# Patient Record
Sex: MALE | Race: WHITE | NOT HISPANIC OR LATINO | Employment: FULL TIME | URBAN - METROPOLITAN AREA
[De-identification: names, ages, dates, MRNs, and addresses within clinical notes are randomized per-mention and may not be internally consistent; named-entity substitution may affect disease eponyms.]

---

## 2017-01-30 ENCOUNTER — SKIN CANCER EXAM (OUTPATIENT)
Dept: URBAN - METROPOLITAN AREA CLINIC 32 | Facility: CLINIC | Age: 62
Setting detail: DERMATOLOGY
End: 2017-01-30

## 2017-01-30 PROBLEM — L57.0 ACTINIC KERATOSIS: Status: ACTIVE | Noted: 2017-01-30

## 2017-01-30 PROBLEM — L57.0 ACTINIC KERATOSIS: Status: RESOLVED | Noted: 2017-01-30

## 2017-01-30 PROCEDURE — 99203 OFFICE O/P NEW LOW 30 MIN: CPT

## 2017-01-30 PROCEDURE — 17000 DESTRUCT PREMALG LESION: CPT

## 2017-01-30 PROCEDURE — 17003 DESTRUCT PREMALG LES 2-14: CPT

## 2017-02-15 ENCOUNTER — FOLLOW UP (OUTPATIENT)
Dept: URBAN - METROPOLITAN AREA CLINIC 32 | Facility: CLINIC | Age: 62
Setting detail: DERMATOLOGY
End: 2017-02-15

## 2017-02-15 PROBLEM — L82.0 INFLAMED SEBORRHEIC KERATOSIS: Status: ACTIVE | Noted: 2017-02-15

## 2017-02-15 PROBLEM — L82.0 INFLAMED SEBORRHEIC KERATOSIS: Status: RESOLVED | Noted: 2017-02-15

## 2017-02-15 PROCEDURE — OTHER NO CHARGE OFFICE VISIT: OTHER

## 2018-02-07 ENCOUNTER — SKIN CANCER EXAM (OUTPATIENT)
Dept: URBAN - METROPOLITAN AREA CLINIC 32 | Facility: CLINIC | Age: 63
Setting detail: DERMATOLOGY
End: 2018-02-07

## 2018-02-07 PROBLEM — L82.0 INFLAMED SEBORRHEIC KERATOSIS: Status: RESOLVED | Noted: 2018-02-07

## 2018-02-07 PROBLEM — L57.0 ACTINIC KERATOSIS: Status: ACTIVE | Noted: 2018-02-07

## 2018-02-07 PROBLEM — L57.0 ACTINIC KERATOSIS: Status: RESOLVED | Noted: 2018-02-07

## 2018-02-07 PROBLEM — L82.0 INFLAMED SEBORRHEIC KERATOSIS: Status: ACTIVE | Noted: 2018-02-07

## 2018-02-07 PROCEDURE — 17003 DESTRUCT PREMALG LES 2-14: CPT

## 2018-02-07 PROCEDURE — 17110 DESTRUCT B9 LESION 1-14: CPT

## 2018-02-07 PROCEDURE — 17000 DESTRUCT PREMALG LESION: CPT

## 2018-02-07 PROCEDURE — 99214 OFFICE O/P EST MOD 30 MIN: CPT

## 2018-04-11 ENCOUNTER — SEE NOTE (OUTPATIENT)
Dept: URBAN - METROPOLITAN AREA CLINIC 32 | Facility: CLINIC | Age: 63
Setting detail: DERMATOLOGY
End: 2018-04-11

## 2018-04-11 PROBLEM — L57.0 ACTINIC KERATOSIS: Status: RESOLVED | Noted: 2018-04-11

## 2018-04-11 PROCEDURE — 17000 DESTRUCT PREMALG LESION: CPT

## 2018-06-20 ENCOUNTER — FOLLOW UP (OUTPATIENT)
Dept: URBAN - METROPOLITAN AREA CLINIC 32 | Facility: CLINIC | Age: 63
Setting detail: DERMATOLOGY
End: 2018-06-20

## 2018-06-20 PROBLEM — L82.1 OTHER SEBORRHEIC KERATOSIS: Status: RESOLVED | Noted: 2018-06-20

## 2018-06-20 PROBLEM — L82.1 OTHER SEBORRHEIC KERATOSIS: Status: ACTIVE | Noted: 2018-06-20

## 2018-06-20 PROBLEM — L57.0 ACTINIC KERATOSIS: Status: RESOLVED | Noted: 2018-06-20

## 2018-06-20 PROCEDURE — 99213 OFFICE O/P EST LOW 20 MIN: CPT

## 2018-06-20 PROCEDURE — 17003 DESTRUCT PREMALG LES 2-14: CPT

## 2018-06-20 PROCEDURE — 17000 DESTRUCT PREMALG LESION: CPT

## 2019-02-13 ENCOUNTER — SKIN CANCER EXAM (OUTPATIENT)
Dept: URBAN - METROPOLITAN AREA CLINIC 32 | Facility: CLINIC | Age: 64
Setting detail: DERMATOLOGY
End: 2019-02-13

## 2019-02-13 DIAGNOSIS — L21.9 SEBORRHEIC DERMATITIS, UNSPECIFIED: ICD-10-CM

## 2019-02-13 DIAGNOSIS — L82.1 OTHER SEBORRHEIC KERATOSIS: ICD-10-CM

## 2019-02-13 PROCEDURE — 99214 OFFICE O/P EST MOD 30 MIN: CPT

## 2020-02-19 ENCOUNTER — SKIN CANCER EXAM (OUTPATIENT)
Dept: URBAN - METROPOLITAN AREA CLINIC 32 | Facility: CLINIC | Age: 65
Setting detail: DERMATOLOGY
End: 2020-02-19

## 2020-02-19 DIAGNOSIS — L90.5 SCAR CONDITIONS AND FIBROSIS OF SKIN: ICD-10-CM

## 2020-02-19 PROBLEM — D18.01 HEMANGIOMA OF SKIN AND SUBCUTANEOUS TISSUE: Status: ACTIVE | Noted: 2020-02-19

## 2020-02-19 PROBLEM — D18.01 HEMANGIOMA OF SKIN AND SUBCUTANEOUS TISSUE: Status: RESOLVED | Noted: 2020-02-19

## 2020-02-19 PROBLEM — L57.0 ACTINIC KERATOSIS: Status: RESOLVED | Noted: 2020-02-19

## 2020-02-19 PROBLEM — L82.1 OTHER SEBORRHEIC KERATOSIS: Status: RESOLVED | Noted: 2020-02-19

## 2020-02-19 PROBLEM — L82.1 OTHER SEBORRHEIC KERATOSIS: Status: ACTIVE | Noted: 2020-02-19

## 2020-02-19 PROCEDURE — 99214 OFFICE O/P EST MOD 30 MIN: CPT

## 2020-02-19 PROCEDURE — 17000 DESTRUCT PREMALG LESION: CPT

## 2020-02-19 PROCEDURE — 17003 DESTRUCT PREMALG LES 2-14: CPT

## 2021-02-24 ENCOUNTER — SKIN CANCER EXAM (OUTPATIENT)
Dept: URBAN - METROPOLITAN AREA CLINIC 32 | Facility: CLINIC | Age: 66
Setting detail: DERMATOLOGY
End: 2021-02-24

## 2021-02-24 DIAGNOSIS — Z41.9 ENCOUNTER FOR PROCEDURE FOR PURPOSES OTHER THAN REMEDYING HEALTH STATE, UNSPECIFIED: ICD-10-CM

## 2021-02-24 PROBLEM — D18.01 HEMANGIOMA OF SKIN AND SUBCUTANEOUS TISSUE: Status: ACTIVE | Noted: 2021-02-24

## 2021-02-24 PROBLEM — L57.0 ACTINIC KERATOSIS: Status: RESOLVED | Noted: 2021-02-24

## 2021-02-24 PROBLEM — D18.01 HEMANGIOMA OF SKIN AND SUBCUTANEOUS TISSUE: Status: RESOLVED | Noted: 2021-02-24

## 2021-02-24 PROBLEM — L82.1 OTHER SEBORRHEIC KERATOSIS: Status: RESOLVED | Noted: 2021-02-24

## 2021-02-24 PROBLEM — L82.1 OTHER SEBORRHEIC KERATOSIS: Status: ACTIVE | Noted: 2021-02-24

## 2021-02-24 PROCEDURE — 17003 DESTRUCT PREMALG LES 2-14: CPT

## 2021-02-24 PROCEDURE — 99213 OFFICE O/P EST LOW 20 MIN: CPT

## 2021-02-24 PROCEDURE — 17000 DESTRUCT PREMALG LESION: CPT

## 2022-03-09 ENCOUNTER — SKIN CANCER EXAM (OUTPATIENT)
Dept: URBAN - METROPOLITAN AREA CLINIC 32 | Facility: CLINIC | Age: 67
Setting detail: DERMATOLOGY
End: 2022-03-09

## 2022-03-09 DIAGNOSIS — L21.8 OTHER SEBORRHEIC DERMATITIS: ICD-10-CM

## 2022-03-09 DIAGNOSIS — L82.1 OTHER SEBORRHEIC KERATOSIS: ICD-10-CM

## 2022-03-09 PROBLEM — L82.0 INFLAMED SEBORRHEIC KERATOSIS: Status: ACTIVE | Noted: 2022-03-09

## 2022-03-09 PROBLEM — L82.0 INFLAMED SEBORRHEIC KERATOSIS: Status: RESOLVED | Noted: 2022-03-09

## 2022-03-09 PROBLEM — L57.0 ACTINIC KERATOSIS: Status: RESOLVED | Noted: 2022-03-09

## 2022-03-09 PROCEDURE — 17000 DESTRUCT PREMALG LESION: CPT

## 2022-03-09 PROCEDURE — 99213 OFFICE O/P EST LOW 20 MIN: CPT

## 2022-03-09 PROCEDURE — 17110 DESTRUCT B9 LESION 1-14: CPT

## 2022-03-09 PROCEDURE — 17003 DESTRUCT PREMALG LES 2-14: CPT

## 2022-05-18 ENCOUNTER — FOLLOW UP (OUTPATIENT)
Dept: URBAN - METROPOLITAN AREA CLINIC 32 | Facility: CLINIC | Age: 67
Setting detail: DERMATOLOGY
End: 2022-05-18

## 2022-05-18 DIAGNOSIS — B08.1 MOLLUSCUM CONTAGIOSUM: ICD-10-CM

## 2022-05-18 PROBLEM — L82.0 INFLAMED SEBORRHEIC KERATOSIS: Status: RESOLVED | Noted: 2022-05-18

## 2022-05-18 PROBLEM — L82.0 INFLAMED SEBORRHEIC KERATOSIS: Status: ACTIVE | Noted: 2022-05-18

## 2022-05-18 PROBLEM — L57.0 ACTINIC KERATOSIS: Status: RESOLVED | Noted: 2022-05-18

## 2022-05-18 PROCEDURE — 17000 DESTRUCT PREMALG LESION: CPT

## 2022-05-18 PROCEDURE — 17110 DESTRUCT B9 LESION 1-14: CPT

## 2022-05-18 PROCEDURE — 17003 DESTRUCT PREMALG LES 2-14: CPT

## 2023-05-10 ENCOUNTER — OFFICE VISIT (OUTPATIENT)
Dept: URBAN - METROPOLITAN AREA CLINIC 111 | Facility: CLINIC | Age: 68
End: 2023-05-10

## 2023-05-10 VITALS — BODY MASS INDEX: 22.04 KG/M2 | HEIGHT: 68 IN

## 2023-05-10 RX ORDER — OLMESARTAN MEDOXOMIL / AMLODIPINE BESYLATE / HYDROCHLOROTHIAZIDE 5; 12.5; 2 MG/1; MG/1; MG/1
TABLET, FILM COATED ORAL
Qty: 0 | Refills: 0 | Status: ACTIVE | COMMUNITY
Start: 1900-01-01

## 2023-05-10 RX ORDER — ASPIRIN 325 MG/1
TABLET ORAL
Qty: 0 | Refills: 0 | Status: ACTIVE | COMMUNITY
Start: 1900-01-01

## 2023-05-10 RX ORDER — BUPROPION HYDROCHLORIDE 150 MG/1
TABLET, EXTENDED RELEASE ORAL
Qty: 0 | Refills: 0 | Status: ACTIVE | COMMUNITY
Start: 1900-01-01

## 2023-05-10 RX ORDER — METHOTREXATE 2.5 MG/1
TABLET ORAL
Qty: 0 | Refills: 0 | Status: ACTIVE | COMMUNITY
Start: 1900-01-01

## 2023-05-10 RX ORDER — OMEPRAZOLE 20 MG/1
TABLET, DELAYED RELEASE ORAL
Qty: 0 | Refills: 0 | Status: ACTIVE | COMMUNITY
Start: 1900-01-01

## 2023-06-29 ENCOUNTER — LAB OUTSIDE AN ENCOUNTER (OUTPATIENT)
Dept: URBAN - METROPOLITAN AREA TELEHEALTH 2 | Facility: TELEHEALTH | Age: 68
End: 2023-06-29

## 2023-06-29 ENCOUNTER — TELEPHONE ENCOUNTER (OUTPATIENT)
Dept: URBAN - METROPOLITAN AREA CLINIC 126 | Facility: CLINIC | Age: 68
End: 2023-06-29

## 2023-06-29 ENCOUNTER — OFFICE VISIT (OUTPATIENT)
Dept: URBAN - METROPOLITAN AREA TELEHEALTH 2 | Facility: TELEHEALTH | Age: 68
End: 2023-06-29
Payer: MEDICARE

## 2023-06-29 DIAGNOSIS — R63.4 WEIGHT LOSS, UNINTENTIONAL: ICD-10-CM

## 2023-06-29 DIAGNOSIS — R68.81 EARLY SATIETY: ICD-10-CM

## 2023-06-29 DIAGNOSIS — R11.14 BILIOUS EMESIS: ICD-10-CM

## 2023-06-29 PROCEDURE — 99443 PHONE E/M BY PHYS 21-30 MIN: CPT | Performed by: INTERNAL MEDICINE

## 2023-06-29 RX ORDER — METHOTREXATE 2.5 MG/1
TABLET ORAL
Qty: 0 | Refills: 0 | Status: ACTIVE | COMMUNITY
Start: 1900-01-01

## 2023-06-29 RX ORDER — POTASSIUM CHLORIDE 1.5 G/1
POWDER, FOR SOLUTION ORAL
Qty: 0 | Refills: 0 | Status: ACTIVE | COMMUNITY
Start: 1900-01-01 | End: 1900-01-01

## 2023-06-29 RX ORDER — ASPIRIN 325 MG/1
TABLET ORAL
Qty: 0 | Refills: 0 | Status: ACTIVE | COMMUNITY
Start: 1900-01-01

## 2023-06-29 RX ORDER — BUPROPION HYDROCHLORIDE 150 MG/1
TABLET, EXTENDED RELEASE ORAL
Qty: 0 | Refills: 0 | Status: ACTIVE | COMMUNITY
Start: 1900-01-01

## 2023-06-29 RX ORDER — OMEPRAZOLE 20 MG/1
TABLET, DELAYED RELEASE ORAL
Qty: 0 | Refills: 0 | Status: ACTIVE | COMMUNITY
Start: 1900-01-01

## 2023-06-29 RX ORDER — OLMESARTAN MEDOXOMIL / AMLODIPINE BESYLATE / HYDROCHLOROTHIAZIDE 5; 12.5; 2 MG/1; MG/1; MG/1
TABLET, FILM COATED ORAL
Qty: 0 | Refills: 0 | Status: ACTIVE | COMMUNITY
Start: 1900-01-01

## 2023-06-29 NOTE — HPI-TODAY'S VISIT:
has nausea and vomiing  no real pain  uses nausea patch lost 15 pounds   no bleeding  has had gastritis but this is different

## 2023-07-05 ENCOUNTER — WEB ENCOUNTER (OUTPATIENT)
Dept: URBAN - METROPOLITAN AREA SURGERY CENTER 19 | Facility: SURGERY CENTER | Age: 68
End: 2023-07-05

## 2023-07-07 ENCOUNTER — WEB ENCOUNTER (OUTPATIENT)
Dept: URBAN - METROPOLITAN AREA SURGERY CENTER 19 | Facility: SURGERY CENTER | Age: 68
End: 2023-07-07

## 2023-07-11 ENCOUNTER — CLAIMS CREATED FROM THE CLAIM WINDOW (OUTPATIENT)
Dept: URBAN - METROPOLITAN AREA CLINIC 4 | Facility: CLINIC | Age: 68
End: 2023-07-11
Payer: MEDICARE

## 2023-07-11 ENCOUNTER — OFFICE VISIT (OUTPATIENT)
Dept: URBAN - METROPOLITAN AREA SURGERY CENTER 19 | Facility: SURGERY CENTER | Age: 68
End: 2023-07-11
Payer: MEDICARE

## 2023-07-11 ENCOUNTER — TELEPHONE ENCOUNTER (OUTPATIENT)
Dept: URBAN - METROPOLITAN AREA CLINIC 126 | Facility: CLINIC | Age: 68
End: 2023-07-11

## 2023-07-11 ENCOUNTER — OFFICE VISIT (OUTPATIENT)
Dept: URBAN - METROPOLITAN AREA CLINIC 82 | Facility: CLINIC | Age: 68
End: 2023-07-11

## 2023-07-11 DIAGNOSIS — K31.89 OTHER DISEASES OF STOMACH AND DUODENUM: ICD-10-CM

## 2023-07-11 DIAGNOSIS — K21.00 GASTROESOPHAGEAL REFLUX DISEASE WITH ESOPHAGITIS WITHOUT HEMORRHAGE: ICD-10-CM

## 2023-07-11 DIAGNOSIS — R11.2 NAUSEA & VOMITING: ICD-10-CM

## 2023-07-11 DIAGNOSIS — T47.8X5A ADVERSE EFFECT OF OTHER AGENTS PRIMARILY AFFECTING GASTROINTESTINAL SYSTEM, INITIAL ENCOUNTER: ICD-10-CM

## 2023-07-11 PROBLEM — 266433003: Status: ACTIVE | Noted: 2023-07-11

## 2023-07-11 PROCEDURE — 43239 EGD BIOPSY SINGLE/MULTIPLE: CPT | Performed by: INTERNAL MEDICINE

## 2023-07-11 PROCEDURE — 88305 TISSUE EXAM BY PATHOLOGIST: CPT | Performed by: PATHOLOGY

## 2023-07-11 PROCEDURE — G8907 PT DOC NO EVENTS ON DISCHARG: HCPCS | Performed by: INTERNAL MEDICINE

## 2023-07-11 PROCEDURE — 88312 SPECIAL STAINS GROUP 1: CPT | Performed by: PATHOLOGY

## 2023-07-11 RX ORDER — BUPROPION HYDROCHLORIDE 150 MG/1
TABLET, EXTENDED RELEASE ORAL
Qty: 0 | Refills: 0 | Status: ACTIVE | COMMUNITY
Start: 1900-01-01

## 2023-07-11 RX ORDER — PANTOPRAZOLE SODIUM 40 MG/1
1 TABLET TABLET, DELAYED RELEASE ORAL TWICE A DAY
Qty: 60 TABLET | Refills: 11 | OUTPATIENT
Start: 2023-07-11

## 2023-07-11 RX ORDER — METHOTREXATE 2.5 MG/1
TABLET ORAL
Qty: 0 | Refills: 0 | Status: ACTIVE | COMMUNITY
Start: 1900-01-01

## 2023-07-11 RX ORDER — OLMESARTAN MEDOXOMIL / AMLODIPINE BESYLATE / HYDROCHLOROTHIAZIDE 5; 12.5; 2 MG/1; MG/1; MG/1
TABLET, FILM COATED ORAL
Qty: 0 | Refills: 0 | Status: ACTIVE | COMMUNITY
Start: 1900-01-01

## 2023-07-11 RX ORDER — ASPIRIN 325 MG/1
TABLET ORAL
Qty: 0 | Refills: 0 | Status: ACTIVE | COMMUNITY
Start: 1900-01-01

## 2023-07-11 RX ORDER — POTASSIUM CHLORIDE 1.5 G/1
POWDER, FOR SOLUTION ORAL
Qty: 0 | Refills: 0 | Status: ACTIVE | COMMUNITY
Start: 1900-01-01 | End: 1900-01-01

## 2023-07-11 RX ORDER — OMEPRAZOLE 20 MG/1
TABLET, DELAYED RELEASE ORAL
Qty: 0 | Refills: 0 | Status: ACTIVE | COMMUNITY
Start: 1900-01-01

## 2023-07-11 RX ORDER — PANTOPRAZOLE SODIUM 40 MG/1
1 TABLET TABLET, DELAYED RELEASE ORAL TWICE A DAY
Qty: 60 TABLET | Refills: 11
Start: 2023-07-11

## 2023-07-12 ENCOUNTER — WEB ENCOUNTER (OUTPATIENT)
Dept: URBAN - METROPOLITAN AREA CLINIC 80 | Facility: CLINIC | Age: 68
End: 2023-07-12

## 2023-07-13 ENCOUNTER — WEB ENCOUNTER (OUTPATIENT)
Dept: URBAN - METROPOLITAN AREA CLINIC 80 | Facility: CLINIC | Age: 68
End: 2023-07-13

## 2023-07-13 RX ORDER — ONDANSETRON 8 MG/1
1 TABLET ON THE TONGUE AND ALLOW TO DISSOLVE AS NEEDED TABLET, ORALLY DISINTEGRATING ORAL ONCE A DAY
Qty: 30 | Refills: 5 | OUTPATIENT
Start: 2023-07-14

## 2023-07-13 RX ORDER — ASPIRIN 325 MG/1
TABLET ORAL
Qty: 0 | Refills: 0 | Status: ACTIVE | COMMUNITY
Start: 1900-01-01

## 2023-07-13 RX ORDER — OMEPRAZOLE 20 MG/1
TABLET, DELAYED RELEASE ORAL
Qty: 0 | Refills: 0 | Status: ACTIVE | COMMUNITY
Start: 1900-01-01

## 2023-07-13 RX ORDER — POTASSIUM CHLORIDE 1.5 G/1
POWDER, FOR SOLUTION ORAL
Qty: 0 | Refills: 0 | Status: ACTIVE | COMMUNITY
Start: 1900-01-01 | End: 1900-01-01

## 2023-07-13 RX ORDER — BUPROPION HYDROCHLORIDE 150 MG/1
TABLET, EXTENDED RELEASE ORAL
Qty: 0 | Refills: 0 | Status: ACTIVE | COMMUNITY
Start: 1900-01-01

## 2023-07-13 RX ORDER — METHOTREXATE 2.5 MG/1
TABLET ORAL
Qty: 0 | Refills: 0 | Status: ACTIVE | COMMUNITY
Start: 1900-01-01

## 2023-07-13 RX ORDER — PANTOPRAZOLE SODIUM 40 MG/1
1 TABLET TABLET, DELAYED RELEASE ORAL TWICE A DAY
Qty: 60 TABLET | Refills: 11 | Status: ACTIVE | COMMUNITY
Start: 2023-07-11

## 2023-07-13 RX ORDER — OLMESARTAN MEDOXOMIL / AMLODIPINE BESYLATE / HYDROCHLOROTHIAZIDE 5; 12.5; 2 MG/1; MG/1; MG/1
TABLET, FILM COATED ORAL
Qty: 0 | Refills: 0 | Status: ACTIVE | COMMUNITY
Start: 1900-01-01

## 2023-07-14 ENCOUNTER — WEB ENCOUNTER (OUTPATIENT)
Dept: URBAN - METROPOLITAN AREA CLINIC 80 | Facility: CLINIC | Age: 68
End: 2023-07-14

## 2023-07-18 ENCOUNTER — WEB ENCOUNTER (OUTPATIENT)
Dept: URBAN - METROPOLITAN AREA CLINIC 80 | Facility: CLINIC | Age: 68
End: 2023-07-18

## 2023-07-20 ENCOUNTER — WEB ENCOUNTER (OUTPATIENT)
Dept: URBAN - METROPOLITAN AREA CLINIC 80 | Facility: CLINIC | Age: 68
End: 2023-07-20

## 2023-08-09 ENCOUNTER — TELEPHONE ENCOUNTER (OUTPATIENT)
Dept: URBAN - METROPOLITAN AREA CLINIC 126 | Facility: CLINIC | Age: 68
End: 2023-08-09

## 2023-08-09 ENCOUNTER — OFFICE VISIT (OUTPATIENT)
Dept: URBAN - METROPOLITAN AREA TELEHEALTH 2 | Facility: TELEHEALTH | Age: 68
End: 2023-08-09
Payer: MEDICARE

## 2023-08-09 VITALS — BODY MASS INDEX: 26.22 KG/M2 | WEIGHT: 173 LBS | HEIGHT: 68 IN

## 2023-08-09 DIAGNOSIS — K21.00 GASTROESOPHAGEAL REFLUX DISEASE WITH ESOPHAGITIS WITHOUT HEMORRHAGE: ICD-10-CM

## 2023-08-09 PROCEDURE — 99442 PHONE E/M BY PHYS 11-20 MIN: CPT | Performed by: INTERNAL MEDICINE

## 2023-08-09 RX ORDER — PANTOPRAZOLE SODIUM 40 MG/1
1 TABLET TABLET, DELAYED RELEASE ORAL TWICE A DAY
Qty: 60 TABLET | Refills: 11 | Status: ACTIVE | COMMUNITY
Start: 2023-07-11

## 2023-08-09 RX ORDER — POTASSIUM CHLORIDE 1.5 G/1
POWDER, FOR SOLUTION ORAL
Qty: 0 | Refills: 0 | Status: ACTIVE | COMMUNITY
Start: 1900-01-01

## 2023-08-09 RX ORDER — OLMESARTAN MEDOXOMIL / AMLODIPINE BESYLATE / HYDROCHLOROTHIAZIDE 5; 12.5; 2 MG/1; MG/1; MG/1
TABLET, FILM COATED ORAL
Qty: 0 | Refills: 0 | Status: ACTIVE | COMMUNITY
Start: 1900-01-01

## 2023-08-09 RX ORDER — ASPIRIN 81 MG/1
1 TABLET TABLET, COATED ORAL ONCE A DAY
Refills: 0 | Status: ACTIVE | COMMUNITY
Start: 1900-01-01

## 2023-08-09 RX ORDER — METHOTREXATE 2.5 MG/1
TABLET ORAL
Qty: 0 | Refills: 0 | Status: ON HOLD | COMMUNITY
Start: 1900-01-01

## 2023-08-09 RX ORDER — BUPROPION HYDROCHLORIDE 150 MG/1
TABLET, EXTENDED RELEASE ORAL
Qty: 0 | Refills: 0 | Status: ACTIVE | COMMUNITY
Start: 1900-01-01

## 2023-08-09 RX ORDER — OMEPRAZOLE 20 MG/1
TABLET, DELAYED RELEASE ORAL
Qty: 0 | Refills: 0 | Status: ACTIVE | COMMUNITY
Start: 1900-01-01

## 2023-10-05 ENCOUNTER — WEB ENCOUNTER (OUTPATIENT)
Dept: URBAN - METROPOLITAN AREA TELEHEALTH 2 | Facility: TELEHEALTH | Age: 68
End: 2023-10-05

## 2023-10-10 ENCOUNTER — DASHBOARD ENCOUNTERS (OUTPATIENT)
Age: 68
End: 2023-10-10

## 2023-10-11 ENCOUNTER — OFFICE VISIT (OUTPATIENT)
Dept: URBAN - METROPOLITAN AREA TELEHEALTH 2 | Facility: TELEHEALTH | Age: 68
End: 2023-10-11
Payer: MEDICARE

## 2023-10-11 VITALS — WEIGHT: 173 LBS | HEIGHT: 68 IN | BODY MASS INDEX: 26.22 KG/M2

## 2023-10-11 DIAGNOSIS — K21.9 ACID REFLUX: ICD-10-CM

## 2023-10-11 PROBLEM — 235595009: Status: ACTIVE | Noted: 2023-10-11

## 2023-10-11 PROCEDURE — 99442 PHONE E/M BY PHYS 11-20 MIN: CPT | Performed by: INTERNAL MEDICINE

## 2023-10-11 RX ORDER — POTASSIUM CHLORIDE 1.5 G/1
POWDER, FOR SOLUTION ORAL
Qty: 0 | Refills: 0 | Status: ACTIVE | COMMUNITY
Start: 1900-01-01

## 2023-10-11 RX ORDER — ASPIRIN 81 MG/1
1 TABLET TABLET, COATED ORAL ONCE A DAY
Refills: 0 | Status: ACTIVE | COMMUNITY
Start: 1900-01-01

## 2023-10-11 RX ORDER — BUPROPION HYDROCHLORIDE 150 MG/1
TABLET, EXTENDED RELEASE ORAL
Qty: 0 | Refills: 0 | Status: ACTIVE | COMMUNITY
Start: 1900-01-01

## 2023-10-11 RX ORDER — METHOTREXATE 2.5 MG/1
TABLET ORAL
Qty: 0 | Refills: 0 | Status: ON HOLD | COMMUNITY
Start: 1900-01-01

## 2023-10-11 RX ORDER — OLMESARTAN MEDOXOMIL / AMLODIPINE BESYLATE / HYDROCHLOROTHIAZIDE 5; 12.5; 2 MG/1; MG/1; MG/1
TABLET, FILM COATED ORAL
Qty: 0 | Refills: 0 | Status: ACTIVE | COMMUNITY
Start: 1900-01-01

## 2023-10-11 RX ORDER — OMEPRAZOLE 20 MG/1
TABLET, DELAYED RELEASE ORAL
Qty: 0 | Refills: 0 | Status: ACTIVE | COMMUNITY
Start: 1900-01-01

## 2023-10-11 RX ORDER — PANTOPRAZOLE SODIUM 40 MG/1
1 TABLET TABLET, DELAYED RELEASE ORAL TWICE A DAY
Qty: 60 TABLET | Refills: 11 | Status: ACTIVE | COMMUNITY
Start: 2023-07-11

## 2023-10-11 NOTE — HPI-TODAY'S VISIT:
dong fine  no vomiing  stopped xeljanz and all symptoms cleared  on one protonix a day  looking to start new arthritis drug